# Patient Record
Sex: FEMALE | Race: WHITE | NOT HISPANIC OR LATINO | Employment: FULL TIME | URBAN - METROPOLITAN AREA
[De-identification: names, ages, dates, MRNs, and addresses within clinical notes are randomized per-mention and may not be internally consistent; named-entity substitution may affect disease eponyms.]

---

## 2023-08-03 PROBLEM — N95.2 ATROPHIC VAGINITIS: Status: ACTIVE | Noted: 2023-08-03

## 2023-08-03 PROBLEM — F41.9 ANXIETY: Status: ACTIVE | Noted: 2023-08-03

## 2023-08-03 PROBLEM — Z87.891 FORMER CIGARETTE SMOKER: Status: ACTIVE | Noted: 2023-08-03

## 2023-08-03 PROBLEM — F33.0 MILD EPISODE OF RECURRENT MAJOR DEPRESSIVE DISORDER (CMS-HCC): Status: ACTIVE | Noted: 2023-08-03

## 2023-08-03 RX ORDER — ESTRADIOL 0.1 MG/G
CREAM VAGINAL DAILY PRN
COMMUNITY
End: 2023-08-04 | Stop reason: SDUPTHER

## 2023-08-03 ASSESSMENT — ENCOUNTER SYMPTOMS
POLYPHAGIA: 0
EYE PAIN: 0
FEVER: 0
TROUBLE SWALLOWING: 0
APPETITE CHANGE: 0
VOMITING: 0
DIZZINESS: 0
SHORTNESS OF BREATH: 0
EYE DISCHARGE: 0
BLOOD IN STOOL: 0
POLYDIPSIA: 0
UNEXPECTED WEIGHT CHANGE: 0
ADENOPATHY: 0
EYE REDNESS: 0
BACK PAIN: 0
SORE THROAT: 0
ABDOMINAL PAIN: 0
HALLUCINATIONS: 0
HEADACHES: 0
HEMATURIA: 0
FREQUENCY: 0
WOUND: 0
PALPITATIONS: 0
CONFUSION: 0
BRUISES/BLEEDS EASILY: 0
DYSURIA: 0
CHILLS: 0
NECK PAIN: 0
COUGH: 0
FATIGUE: 0

## 2023-08-03 ASSESSMENT — PROMIS GLOBAL HEALTH SCALE
RATE_QUALITY_OF_LIFE: VERY GOOD
RATE_MENTAL_HEALTH: GOOD
RATE_GENERAL_HEALTH: VERY GOOD
CARRYOUT_PHYSICAL_ACTIVITIES: COMPLETELY
CARRYOUT_SOCIAL_ACTIVITIES: EXCELLENT
EMOTIONAL_PROBLEMS: SOMETIMES
RATE_PHYSICAL_HEALTH: GOOD
RATE_SOCIAL_SATISFACTION: VERY GOOD
RATE_AVERAGE_FATIGUE: MILD
RATE_AVERAGE_PAIN: 3

## 2023-08-03 NOTE — PROGRESS NOTES
Subjective   Patient ID: Amanda Max is a 58 y.o. female who presents for Annual Exam.  Is pt fasting? Yes  Does pt see any providers other than care team below: No  [place]  Did pt see therapist? no  Pt quit smoking 2014  When did pt start smoking? 1980s, socially in college  How much did she smoke when she was smoking? Socially in college  Phq9=0  , gad7=0      No care team member to display    HPI  Last labs-7/2023 tsh nl, vit d nl, b12 nl, cmp nl, Ldl 131, cbc nl  Due for labs- none    Cholesterol   Date Value Ref Range Status   07/22/2022 204 (H) 0 - 199 mg/dL Final     Comment:     .      AGE      DESIRABLE   BORDERLINE HIGH   HIGH     0-19 Y     0 - 169       170 - 199     >/= 200    20-24 Y     0 - 189       190 - 224     >/= 225         >24 Y     0 - 199       200 - 239     >/= 240   **All ranges are based on fasting samples. Specific   therapeutic targets will vary based on patient-specific   cardiac risk.  .   Pediatric guidelines reference:Pediatrics 2011, 128(S5).   Adult guidelines reference: NCEP ATPIII Guidelines,     JERED 2001, 258:2486-97  .   Venipuncture immediately after or during the    administration of Metamizole may lead to falsely   low results. Testing should be performed immediately   prior to Metamizole dosing.     10/07/2020 190 0 - 199 mg/dL Final     Comment:     .      AGE      DESIRABLE   BORDERLINE HIGH   HIGH     0-19 Y     0 - 169       170 - 199     >/= 200    20-24 Y     0 - 189       190 - 224     >/= 225         >24 Y     0 - 199       200 - 239     >/= 240   **All ranges are based on fasting samples. Specific   therapeutic targets will vary based on patient-specific   cardiac risk.  .   Pediatric guidelines reference:Pediatrics 2011, 128(S5).   Adult guidelines reference: NCEP ATPIII Guidelines,     JERED 2001, 258:2486-97  .   Venipuncture immediately after or during the    administration of Metamizole may lead to falsely   low results. Testing should be performed  "immediately   prior to Metamizole dosing.       Triglycerides   Date Value Ref Range Status   07/22/2022 48 0 - 149 mg/dL Final     Comment:     .      AGE      DESIRABLE   BORDERLINE HIGH   HIGH     VERY HIGH   0 D-90 D    19 - 174         ----         ----        ----  91 D- 9 Y     0 -  74        75 -  99     >/= 100      ----    10-19 Y     0 -  89        90 - 129     >/= 130      ----    20-24 Y     0 - 114       115 - 149     >/= 150      ----         >24 Y     0 - 149       150 - 199    200- 499    >/= 500  .   Venipuncture immediately after or during the    administration of Metamizole may lead to falsely   low results. Testing should be performed immediately   prior to Metamizole dosing.     10/07/2020 56 0 - 149 mg/dL Final     Comment:     .      AGE      DESIRABLE   BORDERLINE HIGH   HIGH     VERY HIGH   0 D-90 D    19 - 174         ----         ----        ----  91 D- 9 Y     0 -  74        75 -  99     >/= 100      ----    10-19 Y     0 -  89        90 - 129     >/= 130      ----    20-24 Y     0 - 114       115 - 149     >/= 150      ----         >24 Y     0 - 149       150 - 199    200- 499    >/= 500  .   Venipuncture immediately after or during the    administration of Metamizole may lead to falsely   low results. Testing should be performed immediately   prior to Metamizole dosing.       HDL   Date Value Ref Range Status   07/22/2022 63.8 mg/dL Final     Comment:     .      AGE      VERY LOW   LOW     NORMAL    HIGH       0-19 Y       < 35   < 40     40-45     ----    20-24 Y       ----   < 40       >45     ----      >24 Y       ----   < 40     40-60      >60  .     10/07/2020 60.2 mg/dL Final     Comment:     .      AGE      VERY LOW   LOW     NORMAL    HIGH       0-19 Y       < 35   < 40     40-45     ----    20-24 Y       ----   < 40       >45     ----      >24 Y       ----   < 40     40-60      >60  .       No results found for: \"LDL\"  TSH   Date Value Ref Range Status   07/22/2022 2.54 0.44 - " "3.98 mIU/L Final     Comment:      TSH testing is performed using different testing    methodology at [facility] than at other    [facility] hospitals. Direct result comparisons should    only be made within the same method.       No results found for: \"A1C\"  No components found for: \"POCA1C\"  No results found for: \"ALBUR\"  No components found for: \"POCALBUR\"      Other concerns: hip pain rt>lf; sometimes throb at night  No fall or injury    bps at home- none    ER/urgicare visits in the last year- none  Hospitalizations in the last year- none      last Pap- 3/26/21; due 3/2026  H/o abn pap-none    FH ovarian, endometrial, cervical, uterine ca-none    last mammo- (40-75/90) 10/13/22    FH br ca-mom,mat aunt, patgm    last colonoscopy/cologuard/FIT (45-75) 10/26/20; due 10/2025  FH colon ca-none      lung ca screening (age 50-75 and 1 ppd x 20yrs and currently smoke or quit within 15yrs)-10/18/22      Exercise- walk;  will go to gym with pt starting sept  Diet-breakfast-coffee-black, occas fruit/yogurt or oatmeal/fruit  Lunch-if not eat breakfast eats above, water  Dinner-variety , water or tea  Son brings home startbucks dessert  Etoh-rare  Body mass index is 21.63 kg/m².    last eye dr appt- glasses; going today  No vision issues    last dental appt- 3/2023    BMs-regular  Sleep-able to fall asleep but hard to stay asleep; no snoring or apnea  no cp, swelling, sob, abd pain, n/v/d/c, blood in stool or black stools  STI testing including hiv (age 15-65) and hep c screening (18-79)-declines        Immunization History   Administered Date(s) Administered    Influenza, injectable, quadrivalent 10/28/2022    Moderna COVID-19 vaccine, bivalent, blue cap/gray label *Check age/dose* 11/01/2022    Moderna SARS-CoV-2 Vaccination 01/19/2022    Pfizer Purple Cap SARS-CoV-2 03/29/2021, 04/19/2021    Tdap vaccine, age 10 years and older (BOOSTRIX) 09/17/2014    Zoster vaccine, recombinant, adult (SHINGRIX) " 07/22/2022, 09/23/2022       fractures in lifetime-none  FH hip/spine/wrist/humerus fx in mom, dad or sibs-mom-wrist  and arm    FH heart attack, heart surgery-dad and mom bypass  FH stroke-mom tia, mat gf cva    The 10-year ASCVD risk score (Cresencio ROMAN, et al., 2019) is: 1.6%    Values used to calculate the score:      Age: 58 years      Sex: Female      Is Non- : No      Diabetic: No      Tobacco smoker: No      Systolic Blood Pressure: 104 mmHg      Is BP treated: No      HDL Cholesterol: 63.8 mg/dL      Total Cholesterol: 204 mg/dL  Coronary Artery Calcium score:  This test is recommended for men 45 or older and women 55 or older without a history of heart disease and have 1 risk factor (high LDL cholesterol, low HDL cholesterol, high blood pressure, smoker (current or past), type 2 diabetes, IBD, lupus, RA, ankylosing spondylitis, psoriasis or family history of  heart disease <55yrs in dad, brother or child or <65yrs in mom, sister, or child.)       Review of Systems   Constitutional:  Negative for appetite change, chills, fatigue, fever and unexpected weight change.   HENT:  Negative for congestion, ear pain, sore throat and trouble swallowing.    Eyes:  Negative for pain, discharge and redness.   Respiratory:  Negative for cough and shortness of breath.    Cardiovascular:  Negative for chest pain and palpitations.   Gastrointestinal:  Negative for abdominal pain, blood in stool and vomiting.   Endocrine: Negative for polydipsia, polyphagia and polyuria.   Genitourinary:  Negative for dysuria, frequency, hematuria and urgency.   Musculoskeletal:  Negative for back pain and neck pain.   Skin:  Negative for rash and wound.   Allergic/Immunologic: Negative for immunocompromised state.   Neurological:  Negative for dizziness, syncope and headaches.   Hematological:  Negative for adenopathy. Does not bruise/bleed easily.   Psychiatric/Behavioral:  Negative for confusion and hallucinations.         Objective   Visit Vitals  /69   Pulse 66   Temp 36.3 °C (97.4 °F)      BP Readings from Last 3 Encounters:   08/04/23 104/69   10/28/22 114/72   07/22/22 102/58     Wt Readings from Last 3 Encounters:   08/04/23 60.8 kg (134 lb)   10/28/22 65.3 kg (144 lb)   07/22/22 62.1 kg (137 lb)           Physical Exam  Constitutional:       General: She is not in acute distress.     Appearance: Normal appearance. She is not ill-appearing.   HENT:      Head: Normocephalic.      Right Ear: Tympanic membrane, ear canal and external ear normal.      Left Ear: Tympanic membrane, ear canal and external ear normal.      Nose: Nose normal.      Mouth/Throat:      Mouth: Mucous membranes are moist.      Pharynx: Oropharynx is clear.   Eyes:      Extraocular Movements: Extraocular movements intact.      Conjunctiva/sclera: Conjunctivae normal.      Pupils: Pupils are equal, round, and reactive to light.   Cardiovascular:      Rate and Rhythm: Normal rate and regular rhythm.      Heart sounds: Normal heart sounds. No murmur heard.  Pulmonary:      Effort: Pulmonary effort is normal. No respiratory distress.      Breath sounds: Normal breath sounds. No wheezing, rhonchi or rales.   Abdominal:      General: Bowel sounds are normal.      Palpations: Abdomen is soft. There is no mass.      Tenderness: There is no abdominal tenderness.   Musculoskeletal:         General: No swelling or tenderness. Normal range of motion.      Cervical back: Normal range of motion and neck supple.      Right lower leg: No edema.      Left lower leg: No edema.   Skin:     General: Skin is warm.      Findings: No rash.   Neurological:      General: No focal deficit present.      Mental Status: She is alert and oriented to person, place, and time.      Cranial Nerves: No cranial nerve deficit.      Motor: No weakness.   Psychiatric:         Mood and Affect: Mood normal.         Behavior: Behavior normal.       Assessment/Plan   Diagnoses and all  orders for this visit:  Routine general medical examination at a health care facility  BMI 21.0-21.9, adult  Atrophic vaginitis  -     estradiol (Estrace) 0.01 % (0.1 mg/gram) vaginal cream; Pea sized amount to vaginal opening daily  Encounter for screening mammogram for malignant neoplasm of breast  -     BI mammo bilateral screening tomosynthesis; Future  Former cigarette smoker  -     CT lung screening low dose; Future  Screening for lung cancer  -     CT lung screening low dose; Future  Bilateral hip pain  -     orphenadrine (Norflex) 100 mg 12 hr tablet; Take 1 tablet (100 mg) by mouth 2 times a day as needed for muscle spasms (or back pain). Do not crush, chew, or split.  Other orders  -     Follow Up In Primary Care - Health Maintenance; Future

## 2023-08-04 ENCOUNTER — OFFICE VISIT (OUTPATIENT)
Dept: PRIMARY CARE | Facility: CLINIC | Age: 58
End: 2023-08-04
Payer: COMMERCIAL

## 2023-08-04 VITALS
TEMPERATURE: 97.4 F | WEIGHT: 134 LBS | BODY MASS INDEX: 21.53 KG/M2 | DIASTOLIC BLOOD PRESSURE: 69 MMHG | HEIGHT: 66 IN | SYSTOLIC BLOOD PRESSURE: 104 MMHG | HEART RATE: 66 BPM

## 2023-08-04 DIAGNOSIS — M25.551 BILATERAL HIP PAIN: ICD-10-CM

## 2023-08-04 DIAGNOSIS — N95.2 ATROPHIC VAGINITIS: ICD-10-CM

## 2023-08-04 DIAGNOSIS — Z00.00 ROUTINE GENERAL MEDICAL EXAMINATION AT A HEALTH CARE FACILITY: Primary | ICD-10-CM

## 2023-08-04 DIAGNOSIS — M25.552 BILATERAL HIP PAIN: ICD-10-CM

## 2023-08-04 DIAGNOSIS — Z12.2 SCREENING FOR LUNG CANCER: ICD-10-CM

## 2023-08-04 DIAGNOSIS — Z87.891 FORMER CIGARETTE SMOKER: ICD-10-CM

## 2023-08-04 DIAGNOSIS — Z12.31 ENCOUNTER FOR SCREENING MAMMOGRAM FOR MALIGNANT NEOPLASM OF BREAST: ICD-10-CM

## 2023-08-04 DIAGNOSIS — H61.23 BILATERAL IMPACTED CERUMEN: ICD-10-CM

## 2023-08-04 PROCEDURE — 99396 PREV VISIT EST AGE 40-64: CPT | Performed by: NURSE PRACTITIONER

## 2023-08-04 PROCEDURE — 3008F BODY MASS INDEX DOCD: CPT | Performed by: NURSE PRACTITIONER

## 2023-08-04 PROCEDURE — 1036F TOBACCO NON-USER: CPT | Performed by: NURSE PRACTITIONER

## 2023-08-04 RX ORDER — ESTRADIOL 0.1 MG/G
CREAM VAGINAL
Qty: 42.5 G | Refills: 11 | Status: SHIPPED | OUTPATIENT
Start: 2023-08-04

## 2023-08-04 RX ORDER — ORPHENADRINE CITRATE 100 MG/1
100 TABLET, EXTENDED RELEASE ORAL 2 TIMES DAILY PRN
Qty: 60 TABLET | Refills: 5 | Status: SHIPPED | OUTPATIENT
Start: 2023-08-04 | End: 2024-01-31

## 2023-08-04 ASSESSMENT — ANXIETY QUESTIONNAIRES
1. FEELING NERVOUS, ANXIOUS, OR ON EDGE: NOT AT ALL
5. BEING SO RESTLESS THAT IT IS HARD TO SIT STILL: NOT AT ALL
GAD7 TOTAL SCORE: 1
7. FEELING AFRAID AS IF SOMETHING AWFUL MIGHT HAPPEN: SEVERAL DAYS
6. BECOMING EASILY ANNOYED OR IRRITABLE: NOT AT ALL
IF YOU CHECKED OFF ANY PROBLEMS ON THIS QUESTIONNAIRE, HOW DIFFICULT HAVE THESE PROBLEMS MADE IT FOR YOU TO DO YOUR WORK, TAKE CARE OF THINGS AT HOME, OR GET ALONG WITH OTHER PEOPLE: NOT DIFFICULT AT ALL
3. WORRYING TOO MUCH ABOUT DIFFERENT THINGS: NOT AT ALL
2. NOT BEING ABLE TO STOP OR CONTROL WORRYING: NOT AT ALL
4. TROUBLE RELAXING: NOT AT ALL

## 2023-08-04 ASSESSMENT — PATIENT HEALTH QUESTIONNAIRE - PHQ9
5. POOR APPETITE OR OVEREATING: NOT AT ALL
9. THOUGHTS THAT YOU WOULD BE BETTER OFF DEAD, OR OF HURTING YOURSELF: NOT AT ALL
SUM OF ALL RESPONSES TO PHQ QUESTIONS 1-9: 1
2. FEELING DOWN, DEPRESSED OR HOPELESS: NOT AT ALL
4. FEELING TIRED OR HAVING LITTLE ENERGY: NOT AT ALL
7. TROUBLE CONCENTRATING ON THINGS, SUCH AS READING THE NEWSPAPER OR WATCHING TELEVISION: NOT AT ALL
1. LITTLE INTEREST OR PLEASURE IN DOING THINGS: NOT AT ALL
3. TROUBLE FALLING OR STAYING ASLEEP OR SLEEPING TOO MUCH: SEVERAL DAYS
8. MOVING OR SPEAKING SO SLOWLY THAT OTHER PEOPLE COULD HAVE NOTICED. OR THE OPPOSITE, BEING SO FIGETY OR RESTLESS THAT YOU HAVE BEEN MOVING AROUND A LOT MORE THAN USUAL: NOT AT ALL
SUM OF ALL RESPONSES TO PHQ9 QUESTIONS 1 AND 2: 0
10. IF YOU CHECKED OFF ANY PROBLEMS, HOW DIFFICULT HAVE THESE PROBLEMS MADE IT FOR YOU TO DO YOUR WORK, TAKE CARE OF THINGS AT HOME, OR GET ALONG WITH OTHER PEOPLE: NOT DIFFICULT AT ALL
6. FEELING BAD ABOUT YOURSELF - OR THAT YOU ARE A FAILURE OR HAVE LET YOURSELF OR YOUR FAMILY DOWN: NOT AT ALL

## 2023-08-04 NOTE — PATIENT INSTRUCTIONS
Valerian root help stay asleep    Hip stretches  Orphenadrine to use as needed  Call if pain worsens or changes--->ortho referral    Set up ct lung     Med refilled. The number of refills on the meds match when you need to return to the office for an appt. I recommend making your next appt today so you don't run out of your medication as it may take me up to 3 days to refill it.    Handouts given to pt:  physical handout      Labs:    You can use the lab in our building when fasting. The hrs are: Monday-Thursday, 7 a.m. - 6 p.m., Friday, 7 a.m. - 5 p.m., Saturday 8 a.m. - 12 noon.   No appt needed, BUT YOU DO NEED THE PAPER ORDER.    Fasting is no food, drink, gum or mints other than water for 12 hrs.   Results will be back in 2-3 business days for most labs. It is always recommended for any orders (labs, xrays, ultrasounds,MRI, ct scan, procedures etc) to check with your insurance provider for expected costs or expenses to you.     Screenings:    To set up mammogram, call [phone]    You will get your results via phone from my medical assistant if you do not have MyChart.  OR  You will get your results via [employer]    If a result is urgent, I will call to speak to you.    Vaccines:  Up to date    General recommendations:  Exercise-cardio 4-5d/wk 30min each day  Diet-Breakfast-toast (my favorite Angella Taveras Delighful Multigrain or Tex's Killer Bread Good Seed thin-sliced)/bagel/English muffin-whole wheat flour as a 1st ingredient or cereal/oatmeal/granola bar-fiber 4g or more or protein like eggs or peanut butter; optional veggies  Lunch-protein, 1/2c carb or 2 slices bread, veg 1c  Dinner-protein, fist sized carb, veg 1c  Fruit 2 a day  Dairy 2 a day-milk, soy milk, almond milk, cheese, yogurt, cottage cheese  Snacks-Protein-hard boiled egg, nuts (walnuts/almonds/pecans/pistachios 1/4c), hummus, beef/deer jerky or meat sticks; vegetable, fruit, dairy-milk(1%, skim, almond, soy)/cheese (not a lot of cheddar)/yogurt  (Ecuadorean is best-my favorite Dannon Fruit on the Bottom Greek)/cottage cheese 2%; triscuits/ popcorn/wheat thins have a lot of fiber; follow serving size on bag/box/container  increase water  Limit alcohol to 1 drink per day for women and 2 drinks per day for men (1 drink=12oz beer or 5oz wine or 1 1/2oz liquor)  Calcium: 500mg 1 twice a day if age 50 and younger and 600mg 1 twice a day if over age 50 (calcium citrate can be taken without food)  Vitamin D: 800-5000 IU/day  Limit salt to <2300mg a day if age 50 and under and <1500mg a day if over age 50/have high bp or diabetes or kidney disease  Recommend folate for childbearing age women 0.4mg per day (can be found in a multivitamin)  Recommend 18mg/dL of iron a day if age 50 and under and 8mg/dL a day if over age 50; take on an empty stomach at bedtime  Use sunscreen   Wear seatbelt  Recommend safe sex practices: using condoms everytime you have sex, discuss with a new partner about their past partners/history of STDs/drug use, avoid drinking alcohol or using drugs as this increases the chance that you will participate in high-risk sex, for oral sex help protect your mouth by having your partner use a condom (male or female), women should not douche after sex, be aware of your partner's body and your body-look for signs of a sore, blister, rash, or discharge, and have regular exams and periodic tests for STDs.  No distracted driving  No driving when under influence of substances  Wear a seatbelt  Eye dr every 1-2yrs  Dentist every 6-12 mon  Tetanus shot every 10yrs  Recommend flu vaccine in the fall  Appt in  1 year for physical      I will communicate with you via [employer] regarding messages and results. If you need help with this, you can call the support line at [phone].    IT WAS A PLEASURE TO SEE YOU TODAY. THANK YOU FOR CHOOSING US FOR YOUR HEALTHCARE NEEDS.

## 2023-11-28 ENCOUNTER — OFFICE VISIT (OUTPATIENT)
Dept: DERMATOLOGY | Facility: CLINIC | Age: 58
End: 2023-11-28
Payer: COMMERCIAL

## 2023-11-28 DIAGNOSIS — L70.0 ACNE VULGARIS: Primary | ICD-10-CM

## 2023-11-28 DIAGNOSIS — L81.4 LENTIGO: ICD-10-CM

## 2023-11-28 DIAGNOSIS — Z12.83 SKIN CANCER SCREENING: ICD-10-CM

## 2023-11-28 DIAGNOSIS — D22.9 NEVUS: ICD-10-CM

## 2023-11-28 PROCEDURE — 3008F BODY MASS INDEX DOCD: CPT | Performed by: NURSE PRACTITIONER

## 2023-11-28 PROCEDURE — 99203 OFFICE O/P NEW LOW 30 MIN: CPT | Performed by: NURSE PRACTITIONER

## 2023-11-28 PROCEDURE — 1036F TOBACCO NON-USER: CPT | Performed by: NURSE PRACTITIONER

## 2023-11-28 RX ORDER — TRETINOIN 0.5 MG/G
CREAM TOPICAL NIGHTLY
Qty: 20 G | Refills: 1 | Status: SHIPPED | OUTPATIENT
Start: 2023-11-28 | End: 2024-11-27

## 2023-11-28 NOTE — PROGRESS NOTES
Subjective     Amanda Max is a 58 y.o. female who presents for the following: Full body skin check.    Review of Systems:  No other skin or systemic complaints other than what is documented elsewhere in the note.    The following portions of the chart were reviewed this encounter and updated as appropriate:          Skin Cancer History  No skin cancer on file.      Specialty Problems    None       Objective   Well appearing patient in no apparent distress; mood and affect are within normal limits.    A focused skin examination was performed. All findings within normal limits unless otherwise noted below.    Assessment/Plan   1. Acne vulgaris    Related Medications  tretinoin (Retin-A) 0.05 % cream  Apply topically once daily at bedtime. Apply a pea size amount nightly to entire face. Moisturize as needed    2. Skin cancer screening    The patient presented for a routine skin examination today. There are no specific concerns regarding skin health and no new or changing moles, lesions, or rashes.     Assessment: Based on the comprehensive skin examination, there were no concerning or abnormal findings. The patient's skin appeared to be in good health, without any notable dermatologic conditions or lesions.    Plan: Given the absence of any significant skin findings, no specific interventions or treatments are warranted at this time. The patient was educated on the importance of regular skin self-examinations and advised to promptly report any changes or concerns. Routine follow-up for a skin examination was recommended.    -These lesions have benign, reassuring patterns on dermoscopy.  -There were no concerning features found on exam today.  -Recommend continued self-observation, and to contact the office if any changes in nevi are  noticed.    Discussed/information given on safe sun practices and use of sunscreen, sun protective clothing or sun avoidance. Recommend to use OTC medication of sunscreen SPF 30 or  higher on a daily basis prior to sun exposure to reduce the risk of skin cancer.    Contact Office if: Any lesions change in size, shape or color; itch, bum or bleed.         3. Nevus  Multiple benign appearing flesh colored to pigmented macules and papules     Plan: Counseling.  I counseled the patient regarding the following:  Instructions: Monthly self-skin checks to monitor for any changes in moles are recommended. Expectations: Benign Nevi are pigmented nests of cells within the skin.No treatment is necessary. Contact Office if: Any moles change in size, shape or color; itch, bum or bleed.    4. Lentigo  Benign pigmented macules appearing in sun exposed areas     Solar lentigo (a type of lentigo also known as a senile lentigo, age spot, or liver spot) is a benign pigmented macule appearing on fair-skinned individuals that is related to ultraviolet radiation (UVR) exposure, typically from the sun.     PLAN:  Limiting sun exposure through avoidance, protective clothing, and use of sunscreens can help prevent the appearance of solar lentigines.    If lesion changes or becomes symptomatic she should return to clinic

## 2024-02-21 ENCOUNTER — OFFICE VISIT (OUTPATIENT)
Dept: DERMATOLOGY | Facility: CLINIC | Age: 59
End: 2024-02-21

## 2024-02-21 DIAGNOSIS — L98.8 WRINKLES: Primary | ICD-10-CM

## 2024-02-21 PROCEDURE — 1036F TOBACCO NON-USER: CPT | Performed by: DERMATOLOGY

## 2024-02-21 PROCEDURE — PBTXA ADMINISTRATION FEE COSMETIC: Performed by: DERMATOLOGY

## 2024-02-21 PROCEDURE — 3008F BODY MASS INDEX DOCD: CPT | Performed by: DERMATOLOGY

## 2024-02-21 PROCEDURE — BOTOX BOTOX 1 UNIT: Performed by: DERMATOLOGY

## 2024-02-21 ASSESSMENT — DERMATOLOGY QUALITY OF LIFE (QOL) ASSESSMENT
RATE HOW BOTHERED YOU ARE BY SYMPTOMS OF YOUR SKIN PROBLEM (EG, ITCHING, STINGING BURNING, HURTING OR SKIN IRRITATION): 0 - NEVER BOTHERED
RATE HOW BOTHERED YOU ARE BY EFFECTS OF YOUR SKIN PROBLEMS ON YOUR ACTIVITIES (EG, GOING OUT, ACCOMPLISHING WHAT YOU WANT, WORK ACTIVITIES OR YOUR RELATIONSHIPS WITH OTHERS): 0 - NEVER BOTHERED
ARE THERE EXCLUSIONS OR EXCEPTIONS FOR THE QUALITY OF LIFE ASSESSMENT: NO
RATE HOW EMOTIONALLY BOTHERED YOU ARE BY YOUR SKIN PROBLEM (FOR EXAMPLE, WORRY, EMBARRASSMENT, FRUSTRATION): 3
WHAT SINGLE SKIN CONDITION LISTED BELOW IS THE PATIENT ANSWERING THE QUALITY-OF-LIFE ASSESSMENT QUESTIONS ABOUT: NONE OF THE ABOVE

## 2024-02-21 ASSESSMENT — DERMATOLOGY PATIENT ASSESSMENT
DO YOU HAVE ANY NEW OR CHANGING LESIONS: NO
DO YOU USE SUNSCREEN: OCCASIONALLY
DO YOU USE A TANNING BED: NO
FOR PATIENTS COMING IN FOR A FOLLOW-UP VISIT - HAVE THERE BEEN ANY CHANGES IN YOUR HEALTH SINCE YOUR LAST VISIT: NO
ARE YOU AN ORGAN TRANSPLANT RECIPIENT: NO

## 2024-02-21 ASSESSMENT — PATIENT GLOBAL ASSESSMENT (PGA): PATIENT GLOBAL ASSESSMENT: PATIENT GLOBAL ASSESSMENT:  1 - CLEAR

## 2024-02-21 ASSESSMENT — ITCH NUMERIC RATING SCALE: HOW SEVERE IS YOUR ITCHING?: 0

## 2024-02-21 NOTE — PROGRESS NOTES
Subjective     Amanda Max is a 59 y.o. female who presents for the following: Cosmetic (Pt cosmetic consult for Botox, and fillers. Pt has never had any previous treatments. ).     Review of Systems:  No other skin or systemic complaints other than what is documented elsewhere in the note.    The following portions of the chart were reviewed this encounter and updated as appropriate:          Skin Cancer History  No skin cancer on file.      Specialty Problems    None       Objective   Well appearing patient in no apparent distress; mood and affect are within normal limits.    A focused skin examination was performed of the face. All findings within normal limits unless otherwise noted below.    Assessment/Plan   1. Wrinkles  Glabella  Dynamic and static rhytids                  Botox is a neurotoxin which prevents muscles from jacob that can help soften and smooth fine lines/wrinkles.  Risks and benefits were discussed bruising and swelling, lid droop, dropping of eyebrow, asymmetrical smile. For continued improvement ongoing treatment with Botox will soften lines  Botox typically begins to work 3 days after injection, full effect by 14 days. Typically lasts about 3 months. Continued treatments will continue to help lines to soften over time. However, lines may not completely disappear.  After treatment do not lie flat for 4 hours and do not exercise for 24 hours after the procedure.    We also briefly discussed filler (not a candidate given areas of concern are glabella, forehead) and Fraxel laser as well.     Chemodenervation - Glabella    Date/Time: 2/21/2024 10:40 AM    Performed by: Vilma Wright DO  Authorized by: Vilma Wright DO      Cosmetic:  yesnot medical botulinum toxin injection    Consent:      Consent obtained:  Written     Consent given by:  Patient     Risks discussed:  Poor cosmetic result     Alternatives discussed:  No treatment    Universal protocol:      Relevant documents  present and verified:  Yes       Site/side verified:  Yes       Immediately prior to procedure a time out was called:  Yes       Patient identity confirmed:  Verbally with patient    Pre-procedure details:   Prep type:  Isopropyl alcohol    Procedure details:      Diluted by:  Preservative free saline     Toxin (Brand):  OnaBoNT-A (Botox)     Concentration (u/mL):  2U/0.1mL    Total units injected:  26    Post-procedure details:      Patient tolerance of procedure:  Tolerated well, no immediate complications    Comments: 18 - glabella - 6,5,5,1,1  8 - forehead    botulinum toxin Type A (Cosm) (Botox) injection 26 Units - Glabella      Related Procedures  Follow Up In Dermatology - Established Patient      Follow up in 2 weeks to recheck (add additional units if needed) otherwise in 3 month for subsequent treatments.

## 2024-03-06 ENCOUNTER — OFFICE VISIT (OUTPATIENT)
Dept: DERMATOLOGY | Facility: CLINIC | Age: 59
End: 2024-03-06

## 2024-03-06 DIAGNOSIS — L98.8 WRINKLES: ICD-10-CM

## 2024-03-06 PROCEDURE — BOTOX BOTOX 1 UNIT: Performed by: DERMATOLOGY

## 2024-03-06 PROCEDURE — PBTXA ADMINISTRATION FEE COSMETIC: Performed by: DERMATOLOGY

## 2024-03-06 PROCEDURE — 3008F BODY MASS INDEX DOCD: CPT | Performed by: DERMATOLOGY

## 2024-03-06 PROCEDURE — 1036F TOBACCO NON-USER: CPT | Performed by: DERMATOLOGY

## 2024-03-06 ASSESSMENT — DERMATOLOGY QUALITY OF LIFE (QOL) ASSESSMENT
RATE HOW BOTHERED YOU ARE BY SYMPTOMS OF YOUR SKIN PROBLEM (EG, ITCHING, STINGING BURNING, HURTING OR SKIN IRRITATION): 0 - NEVER BOTHERED
ARE THERE EXCLUSIONS OR EXCEPTIONS FOR THE QUALITY OF LIFE ASSESSMENT: NO
RATE HOW EMOTIONALLY BOTHERED YOU ARE BY YOUR SKIN PROBLEM (FOR EXAMPLE, WORRY, EMBARRASSMENT, FRUSTRATION): 0 - NEVER BOTHERED
WHAT SINGLE SKIN CONDITION LISTED BELOW IS THE PATIENT ANSWERING THE QUALITY-OF-LIFE ASSESSMENT QUESTIONS ABOUT: NONE OF THE ABOVE
RATE HOW BOTHERED YOU ARE BY EFFECTS OF YOUR SKIN PROBLEMS ON YOUR ACTIVITIES (EG, GOING OUT, ACCOMPLISHING WHAT YOU WANT, WORK ACTIVITIES OR YOUR RELATIONSHIPS WITH OTHERS): 0 - NEVER BOTHERED

## 2024-03-06 ASSESSMENT — ITCH NUMERIC RATING SCALE: HOW SEVERE IS YOUR ITCHING?: 0

## 2024-03-06 ASSESSMENT — PATIENT GLOBAL ASSESSMENT (PGA): PATIENT GLOBAL ASSESSMENT: PATIENT GLOBAL ASSESSMENT:  1 - CLEAR

## 2024-03-06 ASSESSMENT — DERMATOLOGY PATIENT ASSESSMENT
FOR PATIENTS COMING IN FOR A FOLLOW-UP VISIT - HAVE THERE BEEN ANY CHANGES IN YOUR HEALTH SINCE YOUR LAST VISIT: NO
ARE YOU AN ORGAN TRANSPLANT RECIPIENT: NO
DO YOU USE SUNSCREEN: OCCASIONALLY
DO YOU USE A TANNING BED: NO
DO YOU HAVE ANY NEW OR CHANGING LESIONS: NO

## 2024-03-06 NOTE — PROGRESS NOTES
"Subjective     Amanda Max is a 59 y.o. female who presents for the following: Cosmetic (Pt here following up from Botox visit 2/16/24. LV 26 units total injected(18 glabella, 8 forehead). Pt would like to discuss if she needs more Botox. ).     Review of Systems:  No other skin or systemic complaints other than what is documented elsewhere in the note.    The following portions of the chart were reviewed this encounter and updated as appropriate:          Skin Cancer History  No skin cancer on file.      Specialty Problems    None       Objective   Well appearing patient in no apparent distress; mood and affect are within normal limits.    A focused skin examination was performed. All findings within normal limits unless otherwise noted below.    Assessment/Plan   1. Wrinkles (4)  Left Forehead (2), Right Forehead (2)  Dynamic and static rhytids - lateral forehead and lateral aspects of corrugators still with substantial movement    Botox is a neurotoxin which prevents muscles from jacob that can help soften and smooth fine lines/wrinkles.  Risks and benefits were discussed bruising and swelling, lid droop, dropping of eyebrow, asymmetrical smile. For continued improvement ongoing treatment with Botox will soften lines  Botox typically begins to work 3 days after injection, full effect by 14 days. Typically lasts about 3 months. Continued treatments will continue to help lines to soften over time. However, lines may not completely disappear.  After treatment do not lie flat for 4 hours and do not exercise for 24 hours after the procedure.    Recommend 8 additional units today as touch up due to \"spocking\" of the lateral aspect of the brows due to frontalis movement as well as the lateral aspects of the corrugators.    Chemodenervation - Left Forehead (2), Right Forehead (2)    Date/Time: 3/6/2024 8:23 AM    Performed by: Vilma Wright DO  Authorized by: Vilma Wright DO      Cosmetic:  yesnot " medical botulinum toxin injection    Consent:      Consent obtained:  Written     Consent given by:  Patient     Risks discussed:  Poor cosmetic result     Alternatives discussed:  No treatment    Universal protocol:      Relevant documents present and verified:  Yes       Site/side verified:  Yes       Patient identity confirmed:  Verbally with patient    Pre-procedure details:   Prep type:  Isopropyl alcohol    Procedure details:      Diluted by:  Preservative free saline     Toxin (Brand):  OnaBoNT-A (Botox)     Concentration (u/mL):  2U/01mL    Total units injected:  8    Post-procedure details:      Patient tolerance of procedure:  Tolerated well, no immediate complications    Comments: Units injected:  4 - forehead (3 right side, 1 left side)  4 - glabella (2 lateral aspect of each medial corrugators)    botulinum toxin Type A (Cosm) (Botox) injection 8 Units - Left Forehead (2), Right Forehead (2)      Related Procedures  Follow Up In Dermatology - Established Patient      Follow up in 3 months for repeat treatment.

## 2024-03-07 ENCOUNTER — APPOINTMENT (OUTPATIENT)
Dept: DERMATOLOGY | Facility: CLINIC | Age: 59
End: 2024-03-07
Payer: COMMERCIAL

## 2024-06-26 ENCOUNTER — OFFICE VISIT (OUTPATIENT)
Dept: DERMATOLOGY | Facility: CLINIC | Age: 59
End: 2024-06-26

## 2024-06-26 DIAGNOSIS — Z41.9 ELECTIVE SURGERY FOR PURPOSES OTHER THAN TREATING HEALTH CONDITIONS: ICD-10-CM

## 2024-06-26 DIAGNOSIS — L98.8 WRINKLES: Primary | ICD-10-CM

## 2024-06-26 PROCEDURE — 1036F TOBACCO NON-USER: CPT | Performed by: DERMATOLOGY

## 2024-06-26 PROCEDURE — PBTXA ADMINISTRATION FEE COSMETIC: Performed by: DERMATOLOGY

## 2024-06-26 PROCEDURE — 3008F BODY MASS INDEX DOCD: CPT | Performed by: DERMATOLOGY

## 2024-06-26 PROCEDURE — BOTOX BOTOX 1 UNIT: Performed by: DERMATOLOGY

## 2024-06-26 ASSESSMENT — DERMATOLOGY QUALITY OF LIFE (QOL) ASSESSMENT
RATE HOW BOTHERED YOU ARE BY SYMPTOMS OF YOUR SKIN PROBLEM (EG, ITCHING, STINGING BURNING, HURTING OR SKIN IRRITATION): 0 - NEVER BOTHERED
WHAT SINGLE SKIN CONDITION LISTED BELOW IS THE PATIENT ANSWERING THE QUALITY-OF-LIFE ASSESSMENT QUESTIONS ABOUT: NONE OF THE ABOVE
RATE HOW BOTHERED YOU ARE BY EFFECTS OF YOUR SKIN PROBLEMS ON YOUR ACTIVITIES (EG, GOING OUT, ACCOMPLISHING WHAT YOU WANT, WORK ACTIVITIES OR YOUR RELATIONSHIPS WITH OTHERS): 0 - NEVER BOTHERED
RATE HOW EMOTIONALLY BOTHERED YOU ARE BY YOUR SKIN PROBLEM (FOR EXAMPLE, WORRY, EMBARRASSMENT, FRUSTRATION): 2
ARE THERE EXCLUSIONS OR EXCEPTIONS FOR THE QUALITY OF LIFE ASSESSMENT: NO

## 2024-06-26 ASSESSMENT — DERMATOLOGY PATIENT ASSESSMENT
HAVE YOU HAD OR DO YOU HAVE A STAPH INFECTION: NO
DO YOU USE SUNSCREEN: OCCASIONALLY
FOR PATIENTS COMING IN FOR A FOLLOW-UP VISIT - HAVE THERE BEEN ANY CHANGES IN YOUR HEALTH SINCE YOUR LAST VISIT: NO
DO YOU USE A TANNING BED: NO
DO YOU HAVE IRREGULAR MENSTRUAL CYCLES: NO
ARE YOU AN ORGAN TRANSPLANT RECIPIENT: NO
DO YOU HAVE ANY NEW OR CHANGING LESIONS: NO
ARE YOU TRYING TO GET PREGNANT: NO
ARE YOU ON BIRTH CONTROL: NO
HAVE YOU HAD OR DO YOU HAVE VASCULAR DISEASE: NO

## 2024-06-26 ASSESSMENT — ITCH NUMERIC RATING SCALE: HOW SEVERE IS YOUR ITCHING?: 0

## 2024-06-26 ASSESSMENT — PATIENT GLOBAL ASSESSMENT (PGA): PATIENT GLOBAL ASSESSMENT: PATIENT GLOBAL ASSESSMENT:  1 - CLEAR

## 2024-06-26 NOTE — PROGRESS NOTES
Subjective     Amanda Max is a 59 y.o. female who presents for the following: Cosmetic (Pt here for Botox today. Initial visit(2/21/24) pt had 18 units glabella and 8 forehead, at fuv 2 weeks later(3/6/24) 4 more units added to glabella(22 total), and 4 more units to forehead(12 total). Per pt was pleased with results after LV. /).     Review of Systems:  No other skin or systemic complaints other than what is documented elsewhere in the note.    The following portions of the chart were reviewed this encounter and updated as appropriate:          Skin Cancer History  No skin cancer on file.      Specialty Problems    None       Objective   Well appearing patient in no apparent distress; mood and affect are within normal limits.    A focused skin examination was performed. All findings within normal limits unless otherwise noted below.    Assessment/Plan   1. Wrinkles  Glabella  Dynamic and static rhytids    Botox is a neurotoxin which prevents muscles from jacob that can help soften and smooth fine lines/wrinkles.  Risks and benefits were discussed bruising and swelling, lid droop, dropping of eyebrow, asymmetrical smile. For continued improvement ongoing treatment with Botox will soften lines  Botox typically begins to work 3 days after injection, full effect by 14 days. Typically lasts about 3 months. Continued treatments will continue to help lines to soften over time. However, lines may not completely disappear.  After treatment do not lie flat for 4 hours and do not exercise for 24 hours after the procedure.    Chemodenervation - Glabella    Date/Time: 6/26/2024 9:26 AM    Performed by: Vilma Wright DO  Authorized by: Vilma Wright DO      Cosmetic:  yesnot medical botulinum toxin injection    Consent:      Consent obtained:  Written     Consent given by:  Patient     Risks discussed:  Poor cosmetic result     Alternatives discussed:  No treatment    Universal protocol:      Relevant documents  present and verified:  Yes       Site/side verified:  Yes       Patient identity confirmed:  Verbally with patient    Pre-procedure details:   Prep type:  Isopropyl alcohol    Procedure details:      Diluted by:  Preservative free saline     Toxin (Brand):  OnaBoNT-A (Botox)     Concentration (u/mL):  2U/0.1mL    Total units injected:  32    Post-procedure details:      Patient tolerance of procedure:  Tolerated well, no immediate complications    Comments: Units injected:  Glabella - 22 (6,7,7,1)  Forehead - 10 (right greater than left)      botulinum toxin Type A (Cosm) (Botox) injection 32 Units - Glabella      Related Procedures  Follow Up In Dermatology - Established Patient           Detail Level: Simple Additional Notes: Pt reports mild folliculitis with VTAMA. Render Risk Assessment In Note?: no

## 2024-08-02 ASSESSMENT — PROMIS GLOBAL HEALTH SCALE
RATE_QUALITY_OF_LIFE: VERY GOOD
EMOTIONAL_PROBLEMS: NEVER
RATE_MENTAL_HEALTH: GOOD
RATE_PHYSICAL_HEALTH: VERY GOOD
RATE_GENERAL_HEALTH: VERY GOOD
RATE_AVERAGE_PAIN: 1
RATE_SOCIAL_SATISFACTION: VERY GOOD
CARRYOUT_PHYSICAL_ACTIVITIES: COMPLETELY
RATE_AVERAGE_FATIGUE: MILD
CARRYOUT_SOCIAL_ACTIVITIES: EXCELLENT

## 2024-08-08 ASSESSMENT — ENCOUNTER SYMPTOMS
POLYDIPSIA: 0
EYE DISCHARGE: 0
ABDOMINAL PAIN: 0
FEVER: 0
EYE PAIN: 0
HALLUCINATIONS: 0
HEADACHES: 0
BACK PAIN: 0
EYE REDNESS: 0
VOMITING: 0
CONFUSION: 0
BRUISES/BLEEDS EASILY: 0
COUGH: 0
FATIGUE: 0
CHILLS: 0
UNEXPECTED WEIGHT CHANGE: 0
FREQUENCY: 0
APPETITE CHANGE: 0
ADENOPATHY: 0
PALPITATIONS: 0
DYSURIA: 0
NECK PAIN: 0
POLYPHAGIA: 0
HEMATURIA: 0
TROUBLE SWALLOWING: 0
SHORTNESS OF BREATH: 0
DIZZINESS: 0
BLOOD IN STOOL: 0
WOUND: 0
SORE THROAT: 0

## 2024-08-08 NOTE — PROGRESS NOTES
Subjective   Patient ID: Amanda Max is a 59 y.o. female who presents for Annual Exam.      Is pt fasting? Yes   Does pt see any providers other than care team below:   Mariaelena    Did pt set up mammogram? No   Any questions or concerns today? no      No care team member to display    HPI  Last labs-7/2022 tsh nl, vit d nl, b12 nl, cmp nl, Ldl 131, cbc nl  Due for labs- all    Cholesterol   Date Value Ref Range Status   07/22/2022 204 (H) 0 - 199 mg/dL Final     Comment:     .      AGE      DESIRABLE   BORDERLINE HIGH   HIGH     0-19 Y     0 - 169       170 - 199     >/= 200    20-24 Y     0 - 189       190 - 224     >/= 225         >24 Y     0 - 199       200 - 239     >/= 240   **All ranges are based on fasting samples. Specific   therapeutic targets will vary based on patient-specific   cardiac risk.  .   Pediatric guidelines reference:Pediatrics 2011, 128(S5).   Adult guidelines reference: NCEP ATPIII Guidelines,     JERED 2001, 258:2486-97  .   Venipuncture immediately after or during the    administration of Metamizole may lead to falsely   low results. Testing should be performed immediately   prior to Metamizole dosing.     10/07/2020 190 0 - 199 mg/dL Final     Comment:     .      AGE      DESIRABLE   BORDERLINE HIGH   HIGH     0-19 Y     0 - 169       170 - 199     >/= 200    20-24 Y     0 - 189       190 - 224     >/= 225         >24 Y     0 - 199       200 - 239     >/= 240   **All ranges are based on fasting samples. Specific   therapeutic targets will vary based on patient-specific   cardiac risk.  .   Pediatric guidelines reference:Pediatrics 2011, 128(S5).   Adult guidelines reference: NCEP ATPIII Guidelines,     JERED 2001, 258:2486-97  .   Venipuncture immediately after or during the    administration of Metamizole may lead to falsely   low results. Testing should be performed immediately   prior to Metamizole dosing.       Triglycerides   Date Value Ref Range Status   07/22/2022 48 0 -  "149 mg/dL Final     Comment:     .      AGE      DESIRABLE   BORDERLINE HIGH   HIGH     VERY HIGH   0 D-90 D    19 - 174         ----         ----        ----  91 D- 9 Y     0 -  74        75 -  99     >/= 100      ----    10-19 Y     0 -  89        90 - 129     >/= 130      ----    20-24 Y     0 - 114       115 - 149     >/= 150      ----         >24 Y     0 - 149       150 - 199    200- 499    >/= 500  .   Venipuncture immediately after or during the    administration of Metamizole may lead to falsely   low results. Testing should be performed immediately   prior to Metamizole dosing.     10/07/2020 56 0 - 149 mg/dL Final     Comment:     .      AGE      DESIRABLE   BORDERLINE HIGH   HIGH     VERY HIGH   0 D-90 D    19 - 174         ----         ----        ----  91 D- 9 Y     0 -  74        75 -  99     >/= 100      ----    10-19 Y     0 -  89        90 - 129     >/= 130      ----    20-24 Y     0 - 114       115 - 149     >/= 150      ----         >24 Y     0 - 149       150 - 199    200- 499    >/= 500  .   Venipuncture immediately after or during the    administration of Metamizole may lead to falsely   low results. Testing should be performed immediately   prior to Metamizole dosing.       HDL   Date Value Ref Range Status   07/22/2022 63.8 mg/dL Final     Comment:     .      AGE      VERY LOW   LOW     NORMAL    HIGH       0-19 Y       < 35   < 40     40-45     ----    20-24 Y       ----   < 40       >45     ----      >24 Y       ----   < 40     40-60      >60  .     10/07/2020 60.2 mg/dL Final     Comment:     .      AGE      VERY LOW   LOW     NORMAL    HIGH       0-19 Y       < 35   < 40     40-45     ----    20-24 Y       ----   < 40       >45     ----      >24 Y       ----   < 40     40-60      >60  .       No results found for: \"LDL\"  TSH   Date Value Ref Range Status   07/22/2022 2.54 0.44 - 3.98 mIU/L Final     Comment:      TSH testing is performed using different testing    methodology at [place] " [facility] than at other    system hospitals. Direct result comparisons should    only be made within the same method.       No results found for: \"A1C\"  No components found for: \"POCA1C\"  No results found for: \"ALBUR\"  No components found for: \"POCALBUR\"      Other concerns:  earlier this wk 1 spot in underwear of blood    Cognitive decline-mom had vascular dementia/alzheimers    bps at home- none    ER/urgicare visits in the last year- none  Hospitalizations in the last year- none      last Pap- 3/26/21; due 3/2026  H/o abn pap-none    FH ovarian, endometrial, cervical, uterine ca-none    last mammo- (40-75/90) 10/13/22; due    FH br ca-mom,mat aunt, pat gm    last colonoscopy/cologuard/FIT (45-75) 10/26/20; due 10/2025  FH colon ca-none      lung ca screening (age 50-75 and 1 ppd x 20yrs and currently smoke or quit within 15yrs)-10/18/22; neg; not needed- py 7      Exercise- walk; has membership to gym but does not go  Diet-breakfast-coffee-black, occas fruit/yogurt or oatmeal/fruit  Lunch-if not eat breakfast eats above, water  Dinner-variety , water or tea  Son brings home startbucks dessert  Etoh-rare  Body mass index is 20.95 kg/m².    last eye dr appt- glasses; 1yr ago  No vision issues    last dental appt- 5/2024    BMs-regular  Sleep-able to fall asleep and hard to stay asleep; no snoring or apnea  no cp, swelling, sob, abd pain, n/v/d/c, blood in stool or black stools  STI testing including hiv (age 15-65) and hep c screening (18-79)-declines        Immunization History   Administered Date(s) Administered    Influenza, injectable, quadrivalent 10/28/2022    Moderna COVID-19 vaccine, bivalent, blue cap/gray label *Check age/dose* 11/01/2022    Tdap vaccine, age 7 year and older (BOOSTRIX, ADACEL) 09/17/2014    Zoster vaccine, recombinant, adult (SHINGRIX) 07/22/2022, 09/23/2022       fractures in lifetime-none  FH osteoporosis-?mom    FH heart attack, heart surgery-dad stent; mom pacemaker  FH " stroke-mom tia, mat gf cva    The 10-year ASCVD risk score (Cresencio ROMAN, et al., 2019) is: 2%    Values used to calculate the score:      Age: 59 years      Sex: Female      Is Non- : No      Diabetic: No      Tobacco smoker: No      Systolic Blood Pressure: 109 mmHg      Is BP treated: No      HDL Cholesterol: 63.8 mg/dL      Total Cholesterol: 204 mg/dL  Coronary Artery Calcium score:  This test is recommended for men 45 or older and women 55 or older without a history of heart disease and have 1 risk factor (high LDL cholesterol, low HDL cholesterol, high blood pressure, smoker (current or past), type 2 diabetes, IBD, lupus, RA, ankylosing spondylitis, psoriasis or family history of  heart disease <55yrs in dad, brother or child or <65yrs in mom, sister, or child.)       Review of Systems   Constitutional:  Negative for appetite change, chills, fatigue, fever and unexpected weight change.   HENT:  Negative for congestion, ear pain, sore throat and trouble swallowing.    Eyes:  Negative for pain, discharge and redness.   Respiratory:  Negative for cough and shortness of breath.    Cardiovascular:  Negative for chest pain and palpitations.   Gastrointestinal:  Negative for abdominal pain, blood in stool and vomiting.   Endocrine: Negative for polydipsia, polyphagia and polyuria.   Genitourinary:  Negative for dysuria, frequency, hematuria and urgency.   Musculoskeletal:  Negative for back pain and neck pain.   Skin:  Negative for rash and wound.   Allergic/Immunologic: Negative for immunocompromised state.   Neurological:  Negative for dizziness, syncope and headaches.   Hematological:  Negative for adenopathy. Does not bruise/bleed easily.   Psychiatric/Behavioral:  Negative for confusion and hallucinations.        Objective   Visit Vitals  /73   Pulse 68   Temp 36.4 °C (97.5 °F)        BP Readings from Last 3 Encounters:   08/09/24 109/73   08/04/23 104/69   10/28/22 114/72     Wt  Readings from Last 3 Encounters:   08/09/24 58.9 kg (129 lb 12.8 oz)   08/04/23 60.8 kg (134 lb)   10/28/22 65.3 kg (144 lb)           Physical Exam  Constitutional:       General: She is not in acute distress.     Appearance: Normal appearance. She is not ill-appearing.   HENT:      Head: Normocephalic.      Right Ear: Ear canal and external ear normal. There is impacted cerumen.      Left Ear: Ear canal and external ear normal. There is impacted cerumen.      Nose: Nose normal.      Mouth/Throat:      Mouth: Mucous membranes are moist.      Pharynx: Oropharynx is clear.   Eyes:      Extraocular Movements: Extraocular movements intact.      Conjunctiva/sclera: Conjunctivae normal.      Pupils: Pupils are equal, round, and reactive to light.   Cardiovascular:      Rate and Rhythm: Normal rate and regular rhythm.      Heart sounds: Normal heart sounds. No murmur heard.  Pulmonary:      Effort: Pulmonary effort is normal. No respiratory distress.      Breath sounds: Normal breath sounds. No wheezing, rhonchi or rales.   Abdominal:      General: Bowel sounds are normal.      Palpations: Abdomen is soft. There is no mass.      Tenderness: There is no abdominal tenderness.   Musculoskeletal:         General: No swelling or tenderness. Normal range of motion.      Cervical back: Normal range of motion and neck supple.      Right lower leg: No edema.      Left lower leg: No edema.   Skin:     General: Skin is warm.      Findings: No rash.   Neurological:      General: No focal deficit present.      Mental Status: She is alert and oriented to person, place, and time.      Cranial Nerves: No cranial nerve deficit.      Motor: No weakness.   Psychiatric:         Mood and Affect: Mood normal.         Behavior: Behavior normal.       Assessment/Plan   Diagnoses and all orders for this visit:  Routine general medical examination at a health care facility  -     Comprehensive Metabolic Panel; Future  -     CBC and Auto  Differential; Future  -     Lipid Panel; Future  -     TSH with reflex to Free T4 if abnormal; Future  Encounter for screening mammogram for malignant neoplasm of breast  -     BI mammo bilateral screening tomosynthesis; Future  BMI 20.0-20.9, adult  Postmenopausal bleeding  -     US pelvis; Future  Former cigarette smoker  -     CT cardiac scoring wo IV contrast; Future  Bilateral impacted cerumen  -     Ear cerumen removal; Future  Other orders  -     Follow Up In Primary Care - Health Maintenance  -     Tdap vaccine, age 7 years and older  -     Follow Up In Primary Care - Health Maintenance; Future  -     Follow Up In Primary Care - Established; Future

## 2024-08-09 ENCOUNTER — HOSPITAL ENCOUNTER (OUTPATIENT)
Dept: RADIOLOGY | Facility: EXTERNAL LOCATION | Age: 59
Discharge: HOME | End: 2024-08-09

## 2024-08-09 ENCOUNTER — APPOINTMENT (OUTPATIENT)
Dept: PRIMARY CARE | Facility: CLINIC | Age: 59
End: 2024-08-09
Payer: COMMERCIAL

## 2024-08-09 VITALS
BODY MASS INDEX: 20.86 KG/M2 | TEMPERATURE: 97.5 F | WEIGHT: 129.8 LBS | HEART RATE: 68 BPM | SYSTOLIC BLOOD PRESSURE: 109 MMHG | DIASTOLIC BLOOD PRESSURE: 73 MMHG | OXYGEN SATURATION: 97 % | HEIGHT: 66 IN

## 2024-08-09 DIAGNOSIS — Z00.00 ROUTINE GENERAL MEDICAL EXAMINATION AT A HEALTH CARE FACILITY: Primary | ICD-10-CM

## 2024-08-09 DIAGNOSIS — Z12.31 ENCOUNTER FOR SCREENING MAMMOGRAM FOR MALIGNANT NEOPLASM OF BREAST: ICD-10-CM

## 2024-08-09 DIAGNOSIS — H61.23 BILATERAL IMPACTED CERUMEN: ICD-10-CM

## 2024-08-09 DIAGNOSIS — N95.0 POSTMENOPAUSAL BLEEDING: ICD-10-CM

## 2024-08-09 DIAGNOSIS — Z87.891 FORMER CIGARETTE SMOKER: ICD-10-CM

## 2024-08-09 LAB
NON-UH HIE A/G RATIO: 1.3
NON-UH HIE ALB: 4 G/DL (ref 3.4–5)
NON-UH HIE ALK PHOS: 77 UNIT/L (ref 45–117)
NON-UH HIE BASO COUNT: 0.02 X1000 (ref 0–0.2)
NON-UH HIE BASOS %: 0.2 %
NON-UH HIE BILIRUBIN, TOTAL: 0.5 MG/DL (ref 0.3–1.2)
NON-UH HIE BUN/CREAT RATIO: 18.6
NON-UH HIE BUN: 13 MG/DL (ref 9–23)
NON-UH HIE CALCIUM: 9.6 MG/DL (ref 8.7–10.4)
NON-UH HIE CALCULATED LDL CHOLESTEROL: 102 MG/DL (ref 60–130)
NON-UH HIE CALCULATED OSMOLALITY: 284 MOSM/KG (ref 275–295)
NON-UH HIE CHLORIDE: 106 MMOL/L (ref 98–107)
NON-UH HIE CHOLESTEROL: 179 MG/DL (ref 100–200)
NON-UH HIE CO2, VENOUS: 32 MMOL/L (ref 20–31)
NON-UH HIE CREATININE: 0.7 MG/DL (ref 0.5–0.8)
NON-UH HIE DIFF?: NO
NON-UH HIE EOS COUNT: 0.26 X1000 (ref 0–0.5)
NON-UH HIE EOSIN %: 3.2 %
NON-UH HIE GFR AA: >60
NON-UH HIE GLOBULIN: 3 G/DL
NON-UH HIE GLOMERULAR FILTRATION RATE: >60 ML/MIN/1.73M?
NON-UH HIE GLUCOSE: 85 MG/DL (ref 74–106)
NON-UH HIE GOT: 24 UNIT/L (ref 15–37)
NON-UH HIE GPT: 14 UNIT/L (ref 10–49)
NON-UH HIE HCT: 40.6 % (ref 36–46)
NON-UH HIE HDL CHOLESTEROL: 59 MG/DL (ref 40–60)
NON-UH HIE HGB: 13.4 G/DL (ref 12–16)
NON-UH HIE INSTR WBC: 8.2
NON-UH HIE K: 4.5 MMOL/L (ref 3.5–5.1)
NON-UH HIE LYMPH %: 23.1 %
NON-UH HIE LYMPH COUNT: 1.9 X1000 (ref 1.2–4.8)
NON-UH HIE MCH: 30.1 PG (ref 27–34)
NON-UH HIE MCHC: 33 G/DL (ref 32–37)
NON-UH HIE MCV: 91.3 FL (ref 80–100)
NON-UH HIE MONO %: 7 %
NON-UH HIE MONO COUNT: 0.57 X1000 (ref 0.1–1)
NON-UH HIE MPV: 9.3 FL (ref 7.4–10.4)
NON-UH HIE NA: 143 MMOL/L (ref 135–145)
NON-UH HIE NEUTROPHIL %: 66.4 %
NON-UH HIE NEUTROPHIL COUNT (ANC): 5.45 X1000 (ref 1.4–8.8)
NON-UH HIE NUCLEATED RBC: 0 /100WBC
NON-UH HIE PLATELET: 276 X10 (ref 150–450)
NON-UH HIE RBC: 4.45 X10 (ref 4.2–5.4)
NON-UH HIE RDW: 13.3 % (ref 11.5–14.5)
NON-UH HIE TOTAL CHOL/HDL CHOL RATIO: 3
NON-UH HIE TOTAL PROTEIN: 7 G/DL (ref 5.7–8.2)
NON-UH HIE TRIGLYCERIDES: 90 MG/DL (ref 30–150)
NON-UH HIE TSH: 1.92 UIU/ML (ref 0.55–4.78)
NON-UH HIE WBC: 8.2 X10 (ref 4.5–11)

## 2024-08-09 PROCEDURE — 1036F TOBACCO NON-USER: CPT | Performed by: NURSE PRACTITIONER

## 2024-08-09 PROCEDURE — 3008F BODY MASS INDEX DOCD: CPT | Performed by: NURSE PRACTITIONER

## 2024-08-09 PROCEDURE — 90715 TDAP VACCINE 7 YRS/> IM: CPT | Performed by: NURSE PRACTITIONER

## 2024-08-09 PROCEDURE — 90471 IMMUNIZATION ADMIN: CPT | Performed by: NURSE PRACTITIONER

## 2024-08-09 PROCEDURE — 99396 PREV VISIT EST AGE 40-64: CPT | Performed by: NURSE PRACTITIONER

## 2024-08-09 ASSESSMENT — PATIENT HEALTH QUESTIONNAIRE - PHQ9
2. FEELING DOWN, DEPRESSED OR HOPELESS: NOT AT ALL
SUM OF ALL RESPONSES TO PHQ9 QUESTIONS 1 AND 2: 0
1. LITTLE INTEREST OR PLEASURE IN DOING THINGS: NOT AT ALL

## 2024-08-09 NOTE — PATIENT INSTRUCTIONS
Prevagen-otc memory supplement  Next neuro     Set up ct cardiac score  Set up ultrasound pelvis    Let me know if notice vaginal bleeding again    Lf ear irrigation with warm water and hydrogen peroxide  Pt ana procedure well  Lf earwax removed  Debrox 3 drops 3 times a day x 3d  Return for rt ear irrigation    Handouts given to pt:  physical handout        Labs:    You can use the lab in our building when fasting. The hrs are: Monday-Friday, 7 a.m. - 5 p.m., Saturday 8 a.m. - 12 noon.   No appt needed, BUT YOU DO NEED THE PAPER ORDER.    Fasting is no food, drink, gum or mints other than water for 12 hrs.   Results will be back in 2-3 business days for most labs. It is always recommended for any orders (labs, xrays, ultrasounds,MRI, ct scan, procedures etc) to check with your insurance provider for expected costs or expenses to you.     Screenings:    To set up mammogram, call [phone]    You will get your results via phone from my medical assistant if you do not have MyChart.  OR  You will get your results via ebridget    If a result is urgent, I will call to speak to you.    Vaccines:  Tdap today    General recommendations:  Exercise-cardio 4-5d/wk 30min each day  Diet-Breakfast-toast (my favorite Angella Taveras Delighful Multigrain or Tex's Killer Bread Good Seed thin-sliced)/bagel/English muffin-whole wheat flour as a 1st ingredient or cereal/oatmeal/granola bar-fiber 4g or more or protein like eggs or peanut butter; optional veggies  Lunch-protein, 1/2c carb or 2 slices bread, veg 1c  Dinner-protein, fist sized carb, veg 1c  Fruit 2 a day  Dairy 2 a day-milk, soy milk, almond milk, cheese, yogurt, cottage cheese  Snacks-Protein-hard boiled egg, nuts (walnuts/almonds/pecans/pistachios 1/4c), hummus, beef/deer jerky or meat sticks; vegetable, fruit, dairy-milk(1%, skim, almond, soy)/cheese (not a lot of cheddar)/yogurt (Greek is best-my favorite Dannon Fruit on the Bottom Greek)/cottage cheese 2%; triscuits/  popcorn/wheat thins have a lot of fiber; follow serving size on bag/box/container  increase water  Limit alcohol to 1 drink per day for women and 2 drinks per day for men (1 drink=12oz beer or 5oz wine or 1 1/2oz liquor)  Calcium: 500mg 1 twice a day if age 50 and younger and 600mg 1 twice a day if over age 50 (calcium citrate can be taken without food)  Vitamin D: 800-5000 IU/day  Limit salt to <2300mg a day if age 50 and under and <1500mg a day if over age 50/have high bp or diabetes or kidney disease  Recommend folate for childbearing age women 0.4mg per day (can be found in a multivitamin)  Recommend 18mg/dL of iron a day if age 50 and under and 8mg/dL a day if over age 50; take on an empty stomach at bedtime  Use sunscreen   Wear seatbelt  Recommend safe sex practices: using condoms everytime you have sex, discuss with a new partner about their past partners/history of STDs/drug use, avoid drinking alcohol or using drugs as this increases the chance that you will participate in high-risk sex, for oral sex help protect your mouth by having your partner use a condom (male or female), women should not douche after sex, be aware of your partner's body and your body-look for signs of a sore, blister, rash, or discharge, and have regular exams and periodic tests for STDs.  No distracted driving  No driving when under influence of substances  Wear a seatbelt  Eye dr every 1-2yrs  Dentist every 6-12 mon  Tetanus shot every 10yrs  Recommend flu vaccine in the fall  Appt in  1 year for physical      I will communicate with you via Cureeo regarding messages and results. If you need help with this, you can call the support line at [phone].    IT WAS A PLEASURE TO SEE YOU TODAY. THANK YOU FOR CHOOSING US FOR YOUR HEALTHCARE NEEDS.

## 2024-08-29 ENCOUNTER — APPOINTMENT (OUTPATIENT)
Dept: DERMATOLOGY | Facility: CLINIC | Age: 59
End: 2024-08-29
Payer: COMMERCIAL

## 2024-09-18 ENCOUNTER — APPOINTMENT (OUTPATIENT)
Dept: DERMATOLOGY | Facility: CLINIC | Age: 59
End: 2024-09-18
Payer: COMMERCIAL

## 2024-09-25 ENCOUNTER — APPOINTMENT (OUTPATIENT)
Dept: DERMATOLOGY | Facility: CLINIC | Age: 59
End: 2024-09-25
Payer: COMMERCIAL

## 2025-01-30 ENCOUNTER — APPOINTMENT (OUTPATIENT)
Dept: DERMATOLOGY | Facility: CLINIC | Age: 60
End: 2025-01-30
Payer: COMMERCIAL

## 2025-02-13 ASSESSMENT — DERMATOLOGY QUALITY OF LIFE (QOL) ASSESSMENT
RATE HOW EMOTIONALLY BOTHERED YOU ARE BY YOUR SKIN PROBLEM (FOR EXAMPLE, WORRY, EMBARRASSMENT, FRUSTRATION): 0 - NEVER BOTHERED
RATE HOW BOTHERED YOU ARE BY SYMPTOMS OF YOUR SKIN PROBLEM (EG, ITCHING, STINGING BURNING, HURTING OR SKIN IRRITATION): 0 - NEVER BOTHERED
RATE HOW BOTHERED YOU ARE BY SYMPTOMS OF YOUR SKIN PROBLEM (EG, ITCHING, STINGING BURNING, HURTING OR SKIN IRRITATION): 0 - NEVER BOTHERED
RATE HOW BOTHERED YOU ARE BY EFFECTS OF YOUR SKIN PROBLEMS ON YOUR ACTIVITIES (EG, GOING OUT, ACCOMPLISHING WHAT YOU WANT, WORK ACTIVITIES OR YOUR RELATIONSHIPS WITH OTHERS): 0 - NEVER BOTHERED
RATE HOW EMOTIONALLY BOTHERED YOU ARE BY YOUR SKIN PROBLEM (FOR EXAMPLE, WORRY, EMBARRASSMENT, FRUSTRATION): 0 - NEVER BOTHERED
RATE HOW BOTHERED YOU ARE BY EFFECTS OF YOUR SKIN PROBLEMS ON YOUR ACTIVITIES (EG, GOING OUT, ACCOMPLISHING WHAT YOU WANT, WORK ACTIVITIES OR YOUR RELATIONSHIPS WITH OTHERS): 0 - NEVER BOTHERED

## 2025-02-13 NOTE — PROGRESS NOTES
Subjective     Amanda Max is a 59 y.o. female who presents for the following: Acne.   Established patient in for virtual follow up last seen 2023  and prescribed   tretinoin (Retin-A) 0.05 % cream     Review of Systems:  No other skin or systemic complaints other than what is documented elsewhere in the note.    The following portions of the chart were reviewed this encounter and updated as appropriate:       Skin Cancer History  No skin cancer on file.    Specialty Problems    None    Past Medical History:  Amanda Max  has a past medical history of Body mass index (BMI) 23.0-23.9, adult (10/07/2020), Body mass index (BMI) 24.0-24.9, adult (01/15/2021), Calculus of kidney, Hordeolum externum unspecified eye, unspecified eyelid (10/07/2020), Personal history of other infectious and parasitic diseases, and Varicella ().    Past Surgical History:  Amanda Max  has a past surgical history that includes Other surgical history (10/07/2020); Other surgical history (10/07/2020); Other surgical history (2022);  section, low transverse ( and ); Sinus surgery (); Tonsillectomy (); and Rebuck tooth extraction ().    Family History:  Patient family history includes Arthritis in her father and mother; Breast cancer in her mother; Cancer in her father, mother, mother's brother, mother's sister, paternal grandfather, and paternal grandmother; Coronary artery disease in her father; Depression in her father; Diabetes in her father; Early natural death in her maternal grandfather; Hearing loss in her father and mother; Heart disease in her father; Hyperlipidemia in her mother; Hypertension in her father and mother; Kidney disease in her father; Lung cancer in her mother; Stroke in her maternal grandfather; Thyroid disease in her father and sister; Transient ischemic attack in her mother; cabg in her father and mother.    Social History:  Amanda Max  reports that she  quit smoking about 11 years ago. Her smoking use included cigarettes. She started smoking about 40 years ago. She has a 7.3 pack-year smoking history. She has never used smokeless tobacco. She reports that she does not drink alcohol and does not use drugs.    Allergies:  Patient has no known allergies.    Current Medications / CAM's:    Current Outpatient Medications:     estradiol (Estrace) 0.01 % (0.1 mg/gram) vaginal cream, Pea sized amount to vaginal opening daily, Disp: 42.5 g, Rfl: 11    orphenadrine (Norflex) 100 mg 12 hr tablet, Take 1 tablet (100 mg) by mouth 2 times a day as needed for muscle spasms (or back pain). Do not crush, chew, or split., Disp: 60 tablet, Rfl: 5    tretinoin (Retin-A) 0.05 % cream, Apply a thin layer to affected area at bedtime as tolerated., Disp: 20 g, Rfl: 1     Objective   Well appearing patient in no apparent distress; mood and affect are within normal limits.      Assessment/Plan   1. Rhytides  Head - Anterior (Face)  Dynamic and/or static rhytids, lentigines, changes associated with chronic sun exposure.    tretinoin (Retin-A) 0.05 % cream - Head - Anterior (Face)  Apply a thin layer to affected area at bedtime as tolerated.

## 2025-02-14 ENCOUNTER — APPOINTMENT (OUTPATIENT)
Dept: DERMATOLOGY | Facility: CLINIC | Age: 60
End: 2025-02-14
Payer: COMMERCIAL

## 2025-02-14 DIAGNOSIS — L98.8 RHYTIDES: Primary | ICD-10-CM

## 2025-02-14 PROCEDURE — 99213 OFFICE O/P EST LOW 20 MIN: CPT | Performed by: NURSE PRACTITIONER

## 2025-02-14 RX ORDER — TRETINOIN 0.5 MG/G
CREAM TOPICAL
Qty: 20 G | Refills: 1 | Status: SHIPPED | OUTPATIENT
Start: 2025-02-14

## 2025-04-24 DIAGNOSIS — N95.0 POSTMENOPAUSAL BLEEDING: Primary | ICD-10-CM

## 2025-05-04 ENCOUNTER — TELEPHONE (OUTPATIENT)
Dept: PRIMARY CARE | Facility: CLINIC | Age: 60
End: 2025-05-04
Payer: COMMERCIAL

## 2025-05-04 NOTE — TELEPHONE ENCOUNTER
Please call pt.  Pt has not viewed the Metril[employer] message below:         Good morning!  Did you schedule the 2nd ultrasound?

## 2025-05-29 DIAGNOSIS — N95.0 POSTMENOPAUSAL BLEEDING: Primary | ICD-10-CM

## 2025-07-10 ENCOUNTER — APPOINTMENT (OUTPATIENT)
Dept: OBSTETRICS AND GYNECOLOGY | Facility: CLINIC | Age: 60
End: 2025-07-10
Payer: COMMERCIAL

## 2025-07-10 VITALS
WEIGHT: 131 LBS | DIASTOLIC BLOOD PRESSURE: 74 MMHG | BODY MASS INDEX: 21.14 KG/M2 | HEART RATE: 74 BPM | SYSTOLIC BLOOD PRESSURE: 116 MMHG

## 2025-07-10 DIAGNOSIS — N95.0 POSTMENOPAUSAL BLEEDING: ICD-10-CM

## 2025-07-10 PROCEDURE — 1036F TOBACCO NON-USER: CPT

## 2025-07-10 PROCEDURE — 99204 OFFICE O/P NEW MOD 45 MIN: CPT

## 2025-07-10 NOTE — PROGRESS NOTES
Subjective   Patient ID: Amanda Max is a 60 y.o. female who presents for postmenopausal bleeding.    HPI  Patient presents to the office today with concern for postmenopausal bleeding.  Postmenopausal since 2017/2018.  Has had multiple bleeding episodes since August of last year.  Most recent episode was a few months ago.  Vaginal dryness and pain with intercourse; unsure if the bleeding has a pattern associated with after intercourse or not.   Was using estrogen cream, though now has not used in a while per patient.  Last pap 3/2021, normal HPV neg.   Had a colposcopy in her 20s she believes, though normal pap results since.  Pelvic US 4/2025 -- Had transabdominal and transvaginal; brought transvaginal result on her phone today, unable to view in chart.   Uterus anteverted 6.8 x 3.6 x 2.6.  Small uterine fibroid 0.9 x 1.0 x 0.8.  Cystic areas of cervix c/w Nabothian cysts.  Normal ovaries.   Endometrial complex 0.3 cm.     Review of Systems   Genitourinary:  Positive for vaginal bleeding.   All other systems reviewed and are negative.      Objective   Physical Exam  Constitutional:       Appearance: Normal appearance.   HENT:      Head: Normocephalic.   Pulmonary:      Effort: Pulmonary effort is normal.   Genitourinary:     General: Normal vulva.      Vagina: Normal.      Cervix: Normal. No lesion or cervical bleeding.      Uterus: Normal.       Adnexa: Right adnexa normal and left adnexa normal.      Comments: Vaginal atrophy.  Skin:     General: Skin is warm and dry.   Neurological:      Mental Status: She is alert and oriented to person, place, and time.   Psychiatric:         Mood and Affect: Mood normal.         Assessment/Plan   Diagnoses and all orders for this visit:  Postmenopausal bleeding  -     Referral to Gynecology  -     THINPREP PAP TEST    Discussed potential etiologies of PMB --   Had pelvic US done, reviewed.  Pap done and ordered today.  Recommend for endometrial biopsy with  physician.  Will await results; then, discuss HRT and possible pelvic floor PT for painful intercourse.    Kandi Lucia, CHAIM-BOO 07/10/25 10:49 AM

## 2025-07-11 DIAGNOSIS — N88.2 CERVICAL STENOSIS (UTERINE CERVIX): Primary | ICD-10-CM

## 2025-07-11 RX ORDER — MISOPROSTOL 200 UG/1
200 TABLET ORAL ONCE
Qty: 1 TABLET | Refills: 0 | Status: SHIPPED | OUTPATIENT
Start: 2025-07-11 | End: 2025-07-11

## 2025-07-15 ENCOUNTER — APPOINTMENT (OUTPATIENT)
Dept: OBSTETRICS AND GYNECOLOGY | Facility: CLINIC | Age: 60
End: 2025-07-15
Payer: COMMERCIAL

## 2025-07-15 VITALS
SYSTOLIC BLOOD PRESSURE: 111 MMHG | DIASTOLIC BLOOD PRESSURE: 76 MMHG | BODY MASS INDEX: 22.44 KG/M2 | HEART RATE: 76 BPM | WEIGHT: 139 LBS

## 2025-07-15 DIAGNOSIS — N95.0 PMB (POSTMENOPAUSAL BLEEDING): Primary | ICD-10-CM

## 2025-07-15 PROCEDURE — 58100 BIOPSY OF UTERUS LINING: CPT

## 2025-07-15 NOTE — PROGRESS NOTES
Patient ID: Amanda Max is a 60 y.o. female.    Endometrial biopsy    Date/Time: 7/15/2025 10:56 AM    Performed by: TOM Pastor  Authorized by: TOM Pastor    Consent:     Consent obtained: verbal and written    Consent given by: patient    Risks discussed: bleeding, damage to other organs, infection, need for repeat procedure and pain    Patient agrees, verbalizes understanding, and wants to proceed: yes      Procedure explained and questions answered to patient or proxy's satisfaction: yes    Universal protocol:     Test results available and properly labeled: yes      Relevant documents present and verified: yes      Imaging studies available: yes      Required blood products, implants, devices, and special equipment available: yes      Patient identity confirmed: verbally with patient  Indications:     Indications: postmenopausal bleeding      Chronicity of post-menopausal bleeding: recurrent    Progression of post-menopausal bleeding: waxing and waning  Pre-procedure:     Urine pregnancy test: N/A      Premeds: misoprostol and NSAID    Procedure:     A bimanual exam was performed: yes      Uterus size: non-gravid    Prepped with: chlorhexidine    Tenaculum used: yes      A local block was performed: no      Cervix dilated: yes      Number of passes: 2  Findings:     Cervix: stenotic      Uterus depth by sound (cm): 6.5    Specimen collected: low volume sample collected      Patient tolerance: tolerated well, no immediate complications    Assessment/Plan   Problem List Items Addressed This Visit    None  Visit Diagnoses         Codes      PMB (postmenopausal bleeding)    -  Primary N95.0    Relevant Orders    Surgical Pathology Exam                 TOM Pastor 07/15/25 10:56 AM

## 2025-07-20 LAB
LABORATORY COMMENT REPORT: NORMAL
PATH REPORT.COMMENTS IMP SPEC: NORMAL
PATH REPORT.FINAL DX SPEC: NORMAL
PATH REPORT.GROSS SPEC: NORMAL
PATH REPORT.RELEVANT HX SPEC: NORMAL
PATH REPORT.TOTAL CANCER: NORMAL

## 2025-07-25 ENCOUNTER — TELEPHONE (OUTPATIENT)
Dept: OBSTETRICS AND GYNECOLOGY | Facility: CLINIC | Age: 60
End: 2025-07-25
Payer: COMMERCIAL

## 2025-07-31 LAB
CYTOLOGY CMNT CVX/VAG CYTO-IMP: NORMAL
HPV HR 12 DNA GENITAL QL NAA+PROBE: NEGATIVE
HPV HR GENOTYPES PNL CVX NAA+PROBE: NEGATIVE
HPV16 DNA SPEC QL NAA+PROBE: NEGATIVE
HPV18 DNA SPEC QL NAA+PROBE: NEGATIVE
LAB AP HPV GENOTYPE QUESTION: YES
LAB AP HPV HR: NORMAL
LABORATORY COMMENT REPORT: NORMAL
PATH REPORT.TOTAL CANCER: NORMAL

## 2025-08-22 ENCOUNTER — APPOINTMENT (OUTPATIENT)
Dept: PRIMARY CARE | Facility: CLINIC | Age: 60
End: 2025-08-22
Payer: COMMERCIAL

## 2025-08-28 PROBLEM — Z12.2 SCREENING FOR LUNG CANCER: Status: RESOLVED | Noted: 2023-08-04 | Resolved: 2025-08-28

## 2025-08-28 PROBLEM — Z87.891 FORMER CIGARETTE SMOKER: Status: RESOLVED | Noted: 2023-08-03 | Resolved: 2025-08-28

## 2025-08-28 ASSESSMENT — ENCOUNTER SYMPTOMS
EYE DISCHARGE: 0
POLYPHAGIA: 0
HALLUCINATIONS: 0
PALPITATIONS: 0
FREQUENCY: 0
WOUND: 0
COUGH: 0
DIZZINESS: 0
POLYDIPSIA: 0
NECK PAIN: 0
VOMITING: 0
CONFUSION: 0
BACK PAIN: 0
APPETITE CHANGE: 0
CHILLS: 0
UNEXPECTED WEIGHT CHANGE: 0
EYE PAIN: 0
ADENOPATHY: 0
BRUISES/BLEEDS EASILY: 0
BLOOD IN STOOL: 0
DYSURIA: 0
HEMATURIA: 0
FEVER: 0
HEADACHES: 0
FATIGUE: 0
SORE THROAT: 0
ABDOMINAL PAIN: 0
EYE REDNESS: 0
TROUBLE SWALLOWING: 0
SHORTNESS OF BREATH: 0

## 2025-08-29 ENCOUNTER — APPOINTMENT (OUTPATIENT)
Dept: PRIMARY CARE | Facility: CLINIC | Age: 60
End: 2025-08-29
Payer: COMMERCIAL

## 2025-08-29 VITALS
DIASTOLIC BLOOD PRESSURE: 66 MMHG | SYSTOLIC BLOOD PRESSURE: 119 MMHG | OXYGEN SATURATION: 97 % | HEIGHT: 66 IN | TEMPERATURE: 97.1 F | BODY MASS INDEX: 20.99 KG/M2 | WEIGHT: 130.6 LBS | HEART RATE: 76 BPM

## 2025-08-29 DIAGNOSIS — Z00.00 ROUTINE GENERAL MEDICAL EXAMINATION AT A HEALTH CARE FACILITY: Primary | ICD-10-CM

## 2025-08-29 DIAGNOSIS — H61.23 BILATERAL IMPACTED CERUMEN: ICD-10-CM

## 2025-08-29 DIAGNOSIS — N95.2 ATROPHIC VAGINITIS: ICD-10-CM

## 2025-08-29 DIAGNOSIS — Z82.49 FH: MI IN FIRST DEGREE MALE RELATIVE: ICD-10-CM

## 2025-08-29 DIAGNOSIS — Z12.31 ENCOUNTER FOR SCREENING MAMMOGRAM FOR MALIGNANT NEOPLASM OF BREAST: ICD-10-CM

## 2025-08-29 PROCEDURE — 90656 IIV3 VACC NO PRSV 0.5 ML IM: CPT | Performed by: NURSE PRACTITIONER

## 2025-08-29 PROCEDURE — 3008F BODY MASS INDEX DOCD: CPT | Performed by: NURSE PRACTITIONER

## 2025-08-29 PROCEDURE — 90472 IMMUNIZATION ADMIN EACH ADD: CPT | Performed by: NURSE PRACTITIONER

## 2025-08-29 PROCEDURE — 90677 PCV20 VACCINE IM: CPT | Performed by: NURSE PRACTITIONER

## 2025-08-29 PROCEDURE — 90471 IMMUNIZATION ADMIN: CPT | Performed by: NURSE PRACTITIONER

## 2025-08-29 PROCEDURE — 99396 PREV VISIT EST AGE 40-64: CPT | Performed by: NURSE PRACTITIONER

## 2025-08-29 PROCEDURE — 1036F TOBACCO NON-USER: CPT | Performed by: NURSE PRACTITIONER

## 2025-08-29 RX ORDER — ESTRADIOL 0.1 MG/G
CREAM VAGINAL
Qty: 42.5 G | Refills: 11 | Status: SHIPPED | OUTPATIENT
Start: 2025-08-29

## 2025-08-29 ASSESSMENT — PROMIS GLOBAL HEALTH SCALE
RATE_AVERAGE_FATIGUE: MILD
CARRYOUT_SOCIAL_ACTIVITIES: EXCELLENT
RATE_MENTAL_HEALTH: VERY GOOD
RATE_AVERAGE_PAIN: 1
RATE_PHYSICAL_HEALTH: GOOD
RATE_QUALITY_OF_LIFE: VERY GOOD
CARRYOUT_PHYSICAL_ACTIVITIES: COMPLETELY
RATE_SOCIAL_SATISFACTION: VERY GOOD
RATE_GENERAL_HEALTH: VERY GOOD
EMOTIONAL_PROBLEMS: NEVER

## 2025-08-30 LAB
ALBUMIN SERPL-MCNC: 4.5 G/DL (ref 3.6–5.1)
ALP SERPL-CCNC: 66 U/L (ref 37–153)
ALT SERPL-CCNC: 11 U/L (ref 6–29)
ANION GAP SERPL CALCULATED.4IONS-SCNC: 11 MMOL/L (CALC) (ref 7–17)
AST SERPL-CCNC: 23 U/L (ref 10–35)
BASOPHILS # BLD AUTO: 38 CELLS/UL (ref 0–200)
BASOPHILS NFR BLD AUTO: 0.5 %
BILIRUB SERPL-MCNC: 0.6 MG/DL (ref 0.2–1.2)
BUN SERPL-MCNC: 13 MG/DL (ref 7–25)
CALCIUM SERPL-MCNC: 9.6 MG/DL (ref 8.6–10.4)
CHLORIDE SERPL-SCNC: 102 MMOL/L (ref 98–110)
CHOLEST SERPL-MCNC: 189 MG/DL
CHOLEST/HDLC SERPL: 3.2 (CALC)
CO2 SERPL-SCNC: 28 MMOL/L (ref 20–32)
CREAT SERPL-MCNC: 0.68 MG/DL (ref 0.5–1.05)
EGFRCR SERPLBLD CKD-EPI 2021: 100 ML/MIN/1.73M2
EOSINOPHIL # BLD AUTO: 240 CELLS/UL (ref 15–500)
EOSINOPHIL NFR BLD AUTO: 3.2 %
ERYTHROCYTE [DISTWIDTH] IN BLOOD BY AUTOMATED COUNT: 11.7 % (ref 11–15)
GLUCOSE SERPL-MCNC: 78 MG/DL (ref 65–99)
HCT VFR BLD AUTO: 42.6 % (ref 35–45)
HDLC SERPL-MCNC: 59 MG/DL
HGB BLD-MCNC: 13.8 G/DL (ref 11.7–15.5)
LDLC SERPL CALC-MCNC: 114 MG/DL (CALC)
LYMPHOCYTES # BLD AUTO: 1958 CELLS/UL (ref 850–3900)
LYMPHOCYTES NFR BLD AUTO: 26.1 %
MCH RBC QN AUTO: 30.5 PG (ref 27–33)
MCHC RBC AUTO-ENTMCNC: 32.4 G/DL (ref 32–36)
MCV RBC AUTO: 94 FL (ref 80–100)
MONOCYTES # BLD AUTO: 540 CELLS/UL (ref 200–950)
MONOCYTES NFR BLD AUTO: 7.2 %
NEUTROPHILS # BLD AUTO: 4725 CELLS/UL (ref 1500–7800)
NEUTROPHILS NFR BLD AUTO: 63 %
NONHDLC SERPL-MCNC: 130 MG/DL (CALC)
PLATELET # BLD AUTO: 305 THOUSAND/UL (ref 140–400)
PMV BLD REES-ECKER: 11.4 FL (ref 7.5–12.5)
POTASSIUM SERPL-SCNC: 4.3 MMOL/L (ref 3.5–5.3)
PROT SERPL-MCNC: 7.2 G/DL (ref 6.1–8.1)
RBC # BLD AUTO: 4.53 MILLION/UL (ref 3.8–5.1)
SODIUM SERPL-SCNC: 141 MMOL/L (ref 135–146)
TRIGL SERPL-MCNC: 69 MG/DL
TSH SERPL-ACNC: 1.68 MIU/L (ref 0.4–4.5)
WBC # BLD AUTO: 7.5 THOUSAND/UL (ref 3.8–10.8)

## 2025-09-03 ENCOUNTER — APPOINTMENT (OUTPATIENT)
Dept: OBSTETRICS AND GYNECOLOGY | Facility: CLINIC | Age: 60
End: 2025-09-03
Payer: COMMERCIAL

## 2025-11-12 ENCOUNTER — APPOINTMENT (OUTPATIENT)
Dept: OBSTETRICS AND GYNECOLOGY | Facility: CLINIC | Age: 60
End: 2025-11-12
Payer: COMMERCIAL

## 2026-09-11 ENCOUNTER — APPOINTMENT (OUTPATIENT)
Dept: PRIMARY CARE | Facility: CLINIC | Age: 61
End: 2026-09-11
Payer: COMMERCIAL